# Patient Record
Sex: MALE | Race: WHITE | NOT HISPANIC OR LATINO | URBAN - METROPOLITAN AREA
[De-identification: names, ages, dates, MRNs, and addresses within clinical notes are randomized per-mention and may not be internally consistent; named-entity substitution may affect disease eponyms.]

---

## 2020-09-28 ENCOUNTER — TELEPHONE (OUTPATIENT)
Dept: ORTHOPEDICS | Facility: CLINIC | Age: 65
End: 2020-09-28

## 2020-09-28 ENCOUNTER — OFFICE VISIT (OUTPATIENT)
Dept: URGENT CARE | Facility: CLINIC | Age: 65
End: 2020-09-28
Payer: COMMERCIAL

## 2020-09-28 VITALS
DIASTOLIC BLOOD PRESSURE: 88 MMHG | WEIGHT: 245 LBS | HEART RATE: 77 BPM | TEMPERATURE: 98 F | SYSTOLIC BLOOD PRESSURE: 131 MMHG | OXYGEN SATURATION: 95 % | RESPIRATION RATE: 18 BRPM | BODY MASS INDEX: 36.29 KG/M2 | HEIGHT: 69 IN

## 2020-09-28 DIAGNOSIS — T14.90XA TRAUMA: ICD-10-CM

## 2020-09-28 DIAGNOSIS — M25.522 LEFT ELBOW PAIN: ICD-10-CM

## 2020-09-28 DIAGNOSIS — S52.125A CLOSED NONDISPLACED FRACTURE OF HEAD OF LEFT RADIUS, INITIAL ENCOUNTER: Primary | ICD-10-CM

## 2020-09-28 PROCEDURE — 99203 PR OFFICE/OUTPT VISIT, NEW, LEVL III, 30-44 MIN: ICD-10-PCS | Mod: S$GLB,,, | Performed by: NURSE PRACTITIONER

## 2020-09-28 PROCEDURE — 73080 X-RAY EXAM OF ELBOW: CPT | Mod: 26,FY,LT,S$GLB | Performed by: RADIOLOGY

## 2020-09-28 PROCEDURE — 73080 XR ELBOW COMPLETE 3 VIEW LEFT: ICD-10-PCS | Mod: 26,FY,LT,S$GLB | Performed by: RADIOLOGY

## 2020-09-28 PROCEDURE — 99203 OFFICE O/P NEW LOW 30 MIN: CPT | Mod: S$GLB,,, | Performed by: NURSE PRACTITIONER

## 2020-09-28 RX ORDER — TAMSULOSIN HYDROCHLORIDE 0.4 MG/1
CAPSULE ORAL
COMMUNITY
Start: 2020-09-05

## 2020-09-28 RX ORDER — INSULIN LISPRO 100 [IU]/ML
INJECTION, SOLUTION INTRAVENOUS; SUBCUTANEOUS
COMMUNITY

## 2020-09-28 RX ORDER — CITALOPRAM 20 MG/1
TABLET, FILM COATED ORAL
COMMUNITY
Start: 2020-09-28

## 2020-09-28 RX ORDER — HYDROCHLOROTHIAZIDE 12.5 MG/1
CAPSULE ORAL
COMMUNITY

## 2020-09-28 RX ORDER — CHOLECALCIFEROL (VITAMIN D3) 25 MCG
TABLET ORAL
COMMUNITY

## 2020-09-28 RX ORDER — TESTOSTERONE CYPIONATE 200 MG/ML
200 INJECTION, SOLUTION INTRAMUSCULAR
COMMUNITY

## 2020-09-28 RX ORDER — RAMIPRIL 2.5 MG/1
CAPSULE ORAL
COMMUNITY

## 2020-09-28 RX ORDER — ALLOPURINOL 300 MG/1
TABLET ORAL
COMMUNITY

## 2020-09-28 RX ORDER — PRIMIDONE 50 MG/1
TABLET ORAL
COMMUNITY

## 2020-09-28 RX ORDER — SEMAGLUTIDE 1.34 MG/ML
1 INJECTION, SOLUTION SUBCUTANEOUS
COMMUNITY

## 2020-09-28 RX ORDER — TRAMADOL HYDROCHLORIDE 50 MG/1
50 TABLET ORAL EVERY 6 HOURS PRN
Qty: 20 TABLET | Refills: 0 | Status: SHIPPED | OUTPATIENT
Start: 2020-09-28 | End: 2020-10-03

## 2020-09-28 RX ORDER — ATORVASTATIN CALCIUM 10 MG/1
TABLET, FILM COATED ORAL
COMMUNITY

## 2020-09-28 RX ORDER — ASPIRIN 81 MG/1
TABLET ORAL
COMMUNITY

## 2020-09-28 NOTE — TELEPHONE ENCOUNTER
Spoke with pt.  He is currently in town on vacation.   Pt has an orthopedist back home in [place].    Pt will make an appointment to see his orthopedist upon his return there on 10/5.    Pt advised, per MD, that he will need surgical repair of his left elbow injury.   Pt advised that the longer he waits to have this repaired, the more stiff his elbow will become.  Pt verbalized understanding .   Pt reports he was given a sling and tramadol at .   Pt is requesting an appointment with Orthopedics for a splint or bracing until he returns home to [place].     Pt scheduled to see Ludy Zavala PA-C tomorrow morning at 745 am for evaluation and placement into a posterior long arm splint.    Pt verbalized understanding.

## 2020-09-28 NOTE — PATIENT INSTRUCTIONS
Elbow Fracture    You have a break or fracture of the elbow. That means you have a crack or break in one or more of the bones of the elbow joint. Fractures usually takes 4-12 weeks to heal, depending on the type. Initial treatment is with a splint or cast. Severe fractures may require surgery to put the bone fragments back into place.  The elbow joint is formed by three arm bones:  · Radius. This is the bone on the thumb side of the forearm.  · Ulna. This is the bone on the little-finger side of the forearm. The ulna forms the tip of the elbow.  · Humerus. This is the upper arm bone that connects to the shoulder.  Home care  · Keep your arm elevated to reduce pain and swelling. When sitting or lying down elevate your arm above the level of your heart. You can do this by placing your arm on a pillow that rests on your chest or on a pillow at your side. This is most important during the first 48 hours after injury.  · Apply an ice pack over the injured area for 15 to 20 minutes every 3 to 6 hours. You should do this for the first 24 to 48 hours. You can make an ice pack by filling a plastic bag that seals at the top with ice cubes and then wrapping it with a thin towel. Never put ice or an ice pack directly on your skin. You can place the ice pack inside the sling and directly over the splint. Continue to use ice packs for relief of pain and swelling as needed. As the ice melts, be careful to avoid getting your wrap, splint, or cast wet. After 48 hours, apply heat (warm shower or warm bath) for 15 to 20 minutes several times a day, or alternate ice and heat.  · If you were given a sling and splint, leave it in place for the time advised. Keep the splint completely dry at all times. Bathe with your splint out of the water protected with 2 large plastic bags, one outside of the other, each taped with duct tape at the top end. If a fiberglass splint/cast gets wet, you can dry it with a hair dryer on a cool  setting.  · If you were given a sling only, wear it for the first week. Unless told otherwise, gradually begin range of motion exercises, after the first week, or as advised by your healthcare provider.  · You may use over-the-counter pain medicine to control pain, unless another pain medicine was prescribed. If you have chronic liver or kidney disease or ever had a stomach ulcer or GI bleeding, talk with your healthcare provider using these medicines.  Follow-up care  Follow up with your healthcare provider, or as advised.  An elbow joint will become stiff if immobile for too long. Ask your healthcare provider when to begin range of motion exercises to prevent the elbow from getting stiff. If X-rays were taken, you will be told if there are any new findings that may affect your care.  When to seek medical advice  Call your healthcare provider right away if any of these occur:  · The plaster splint becomes wet or soft  · The cast becomes loose  · The fiberglass splint remains wet for more than 24 hours  · Increased tightness or pain develops in the elbow  · A foul smell comes from the cast  · Fingers become swollen, cold, blue, numb or tingly  Date Last Reviewed: 12/3/2015  © 1959-0040 [employer]. [address] [place] [address], [place] [address]. All rights reserved. This information is not intended as a substitute for professional medical care. Always follow your healthcare professional's instructions.

## 2020-09-28 NOTE — TELEPHONE ENCOUNTER
----- Message from Fouzia Stevens LPN sent at 9/28/2020  1:16 PM CDT -----  Regarding: FW: Orthopedic Referral    ----- Message -----  From: Marilyn Sinha  Sent: 9/28/2020  12:27 PM CDT  To: [facility] Ortho Triage  Subject: Orthopedic Referral                              Good Afternoon,    We received a call from Urgent Care for this patient to be seen a soon as possible. The diagnosis is fracture in elbow. Please let me know if there is anything else you need.    Thank you,  Marilyn

## 2020-09-28 NOTE — PROGRESS NOTES
"Subjective:       Patient ID: Patrice Hodge is a 65 y.o. male.    Vitals:  height is 5' 9" (1.753 m) and weight is 111.1 kg (245 lb). His temperature is 98.4 °F (36.9 °C). His blood pressure is 131/88 and his pulse is 77. His respiration is 18 and oxygen saturation is 95%.     Chief Complaint: Elbow Injury    Patient presents with c.o left elbow pain. Pain secondary to a fall that occurred last night. Patient denies head trauma in the incident. Denies abrasions or cuts.     Provider note begins below:    Mr Hodge fell from standing and hit left elbow last night. Able to flex/extend with limited ROM and quite painful. No numbness or paresthesias. Left radial pulse present and strong. Distal left phalanges cap refill < 3 sec.    No cuts or abrasions. No head trauma or LOC.    Elbow Injury  This is a new problem. The current episode started yesterday. Episode frequency: once. The problem has been gradually worsening. Associated symptoms include joint swelling. Pertinent negatives include no abdominal pain, fatigue or vertigo. Exacerbated by: movement. He has tried NSAIDs for the symptoms. The treatment provided no relief.       Constitution: Negative for fatigue.   HENT: Negative for facial swelling and facial trauma.    Neck: Negative for neck stiffness.   Cardiovascular: Negative for chest trauma.   Eyes: Negative for eye trauma, double vision and blurred vision.   Gastrointestinal: Negative for abdominal trauma, abdominal pain and rectal bleeding.   Genitourinary: Negative for hematuria, genital trauma and pelvic pain.   Musculoskeletal: Positive for pain, trauma and joint swelling. Negative for abnormal ROM of joint and pain with walking.   Skin: Negative for color change, wound, abrasion and laceration.   Neurological: Negative for dizziness, history of vertigo, light-headedness, coordination disturbances, altered mental status and loss of consciousness.   Hematologic/Lymphatic: Negative for history of bleeding " disorder.   Psychiatric/Behavioral: Negative for altered mental status.       Objective:      Physical Exam   Constitutional: He is oriented to person, place, and time. He appears well-developed. He is cooperative.  Non-toxic appearance. He does not appear ill. No distress.   HENT:   Head: Normocephalic and atraumatic.   Ears:   Right Ear: Hearing and external ear normal.   Left Ear: Hearing and external ear normal.   Nose: Nose normal. No mucosal edema, rhinorrhea or nasal deformity. No epistaxis.   Mouth/Throat: Mucous membranes are normal.   Eyes: Conjunctivae and lids are normal. Right eye exhibits no discharge. Left eye exhibits no discharge. No scleral icterus.   Neck: Trachea normal, normal range of motion, full passive range of motion without pain and phonation normal. Neck supple.   Cardiovascular: Normal rate, regular rhythm, normal heart sounds and normal pulses.   Pulmonary/Chest: Effort normal and breath sounds normal. No respiratory distress.   Abdominal: Soft. Normal appearance and bowel sounds are normal. He exhibits no distension, no pulsatile midline mass and no mass. There is no abdominal tenderness.   Musculoskeletal:         General: No deformity.      Left elbow: He exhibits decreased range of motion, swelling and effusion. He exhibits no deformity and no laceration. Tenderness found. Radial head tenderness noted.   Neurological: He is alert and oriented to person, place, and time. He exhibits normal muscle tone. Coordination normal.   Skin: Skin is warm, dry, intact, not diaphoretic and not pale. Psychiatric: His speech is normal and behavior is normal. Judgment and thought content normal.   Nursing note and vitals reviewed.    Xr Elbow Complete 3 View Left    Result Date: 9/28/2020  EXAMINATION: XR ELBOW COMPLETE 3 VIEW LEFT CLINICAL HISTORY: Injury, unspecified, initial encounter TECHNIQUE: AP, lateral, and oblique views of the left elbow were performed. COMPARISON: None FINDINGS: Acute,  traumatic, displaced fracture through the radial head.  Adjacent soft tissue edema.  Small elbow joint effusion.     Acute, traumatic, mildly displaced radial head fracture. Electronically signed by: Aysha Olguin Date:    09/28/2020 Time:    12:13        Assessment:       1. Closed nondisplaced fracture of head of left radius, initial encounter    2. Trauma    3. Left elbow pain        Plan:       Xrays ordered at this visit reviewed.     Closed nondisplaced fracture of head of left radius, initial encounter  -     SLING FOR HOME USE  -     Ambulatory referral/consult to Orthopedics    Trauma  -     XR ELBOW COMPLETE 3 VIEW LEFT; Future; Expected date: 09/28/2020  -     Ambulatory referral/consult to Orthopedics    Left elbow pain  -     traMADoL (ULTRAM) 50 mg tablet; Take 1 tablet (50 mg total) by mouth every 6 (six) hours as needed for Pain.  Dispense: 20 tablet; Refill: 0  -     Ambulatory referral/consult to Orthopedics      Patient Instructions     Elbow Fracture    You have a break or fracture of the elbow. That means you have a crack or break in one or more of the bones of the elbow joint. Fractures usually takes 4-12 weeks to heal, depending on the type. Initial treatment is with a splint or cast. Severe fractures may require surgery to put the bone fragments back into place.  The elbow joint is formed by three arm bones:  · Radius. This is the bone on the thumb side of the forearm.  · Ulna. This is the bone on the little-finger side of the forearm. The ulna forms the tip of the elbow.  · Humerus. This is the upper arm bone that connects to the shoulder.  Home care  · Keep your arm elevated to reduce pain and swelling. When sitting or lying down elevate your arm above the level of your heart. You can do this by placing your arm on a pillow that rests on your chest or on a pillow at your side. This is most important during the first 48 hours after injury.  · Apply an ice pack over the injured area for 15  to 20 minutes every 3 to 6 hours. You should do this for the first 24 to 48 hours. You can make an ice pack by filling a plastic bag that seals at the top with ice cubes and then wrapping it with a thin towel. Never put ice or an ice pack directly on your skin. You can place the ice pack inside the sling and directly over the splint. Continue to use ice packs for relief of pain and swelling as needed. As the ice melts, be careful to avoid getting your wrap, splint, or cast wet. After 48 hours, apply heat (warm shower or warm bath) for 15 to 20 minutes several times a day, or alternate ice and heat.  · If you were given a sling and splint, leave it in place for the time advised. Keep the splint completely dry at all times. Bathe with your splint out of the water protected with 2 large plastic bags, one outside of the other, each taped with duct tape at the top end. If a fiberglass splint/cast gets wet, you can dry it with a hair dryer on a cool setting.  · If you were given a sling only, wear it for the first week. Unless told otherwise, gradually begin range of motion exercises, after the first week, or as advised by your healthcare provider.  · You may use over-the-counter pain medicine to control pain, unless another pain medicine was prescribed. If you have chronic liver or kidney disease or ever had a stomach ulcer or GI bleeding, talk with your healthcare provider using these medicines.  Follow-up care  Follow up with your healthcare provider, or as advised.  An elbow joint will become stiff if immobile for too long. Ask your healthcare provider when to begin range of motion exercises to prevent the elbow from getting stiff. If X-rays were taken, you will be told if there are any new findings that may affect your care.  When to seek medical advice  Call your healthcare provider right away if any of these occur:  · The plaster splint becomes wet or soft  · The cast becomes loose  · The fiberglass splint remains  wet for more than 24 hours  · Increased tightness or pain develops in the elbow  · A foul smell comes from the cast  · Fingers become swollen, cold, blue, numb or tingly  Date Last Reviewed: 12/3/2015  © 4617-3109 [employer]. [address] [place] [id number]. All rights reserved. This information is not intended as a substitute for professional medical care. Always follow your healthcare professional's instructions.

## 2020-09-29 ENCOUNTER — OFFICE VISIT (OUTPATIENT)
Dept: ORTHOPEDICS | Facility: CLINIC | Age: 65
End: 2020-09-29
Payer: COMMERCIAL

## 2020-09-29 VITALS
BODY MASS INDEX: 36.29 KG/M2 | WEIGHT: 245 LBS | SYSTOLIC BLOOD PRESSURE: 144 MMHG | HEIGHT: 69 IN | TEMPERATURE: 97 F | DIASTOLIC BLOOD PRESSURE: 86 MMHG | HEART RATE: 76 BPM | RESPIRATION RATE: 18 BRPM

## 2020-09-29 DIAGNOSIS — S52.122A CLOSED DISPLACED FRACTURE OF HEAD OF LEFT RADIUS, INITIAL ENCOUNTER: Primary | ICD-10-CM

## 2020-09-29 PROCEDURE — 3008F PR BODY MASS INDEX (BMI) DOCUMENTED: ICD-10-PCS | Mod: CPTII,S$GLB,, | Performed by: PHYSICIAN ASSISTANT

## 2020-09-29 PROCEDURE — 3008F BODY MASS INDEX DOCD: CPT | Mod: CPTII,S$GLB,, | Performed by: PHYSICIAN ASSISTANT

## 2020-09-29 PROCEDURE — 29105 APPLICATION LONG ARM SPLINT: CPT | Mod: LT,S$GLB,, | Performed by: PHYSICIAN ASSISTANT

## 2020-09-29 PROCEDURE — 99203 OFFICE O/P NEW LOW 30 MIN: CPT | Mod: 25,S$GLB,, | Performed by: PHYSICIAN ASSISTANT

## 2020-09-29 PROCEDURE — 29105 PR APPLY LONG ARM SPLINT: ICD-10-PCS | Mod: LT,S$GLB,, | Performed by: PHYSICIAN ASSISTANT

## 2020-09-29 PROCEDURE — 99203 PR OFFICE/OUTPT VISIT, NEW, LEVL III, 30-44 MIN: ICD-10-PCS | Mod: 25,S$GLB,, | Performed by: PHYSICIAN ASSISTANT

## 2020-09-29 PROCEDURE — 99999 PR PBB SHADOW E&M-EST. PATIENT-LVL IV: CPT | Mod: PBBFAC,,, | Performed by: PHYSICIAN ASSISTANT

## 2020-09-29 PROCEDURE — 99999 PR PBB SHADOW E&M-EST. PATIENT-LVL IV: ICD-10-PCS | Mod: PBBFAC,,, | Performed by: PHYSICIAN ASSISTANT

## 2020-09-29 RX ORDER — HYDROCODONE BITARTRATE AND ACETAMINOPHEN 5; 325 MG/1; MG/1
1 TABLET ORAL
Qty: 42 TABLET | Refills: 0 | Status: SHIPPED | OUTPATIENT
Start: 2020-09-29

## 2020-09-29 NOTE — LETTER
September 29, 2020      Josef Wilson, CIERRA  [address]  [place] [address]  [facility] [address]           [facility] 5th [address]  [address]  [facility] [address]  Phone: [phone]          Patient: Patrice Hodge   MR Number: [id number]   YOB: 1955   Date of Visit: 9/29/2020       Dear Josef Wilson:    Thank you for referring Patrice Hodge to me for evaluation. Attached you will find relevant portions of my assessment and plan of care.    If you have questions, please do not hesitate to call me. I look forward to following Patrice Hodge along with you.    Sincerely,    Ludy Zavala PA-C    Enclosure  CC:  No Recipients    If you would like to receive this communication electronically, please contact [email or web address] or [phone] to request more information on [facility] Link access.    For providers and/or their staff who would like to refer a patient to [facility], please contact us through our one-stop-shop provider referral line, [facility] , at [phone].    If you feel you have received this communication in error or would no longer like to receive these types of communications, please e-mail [email or web address]

## 2020-09-29 NOTE — PROGRESS NOTES
Subjective:      Patient ID: Patrice Hodge is a 65 y.o. male.    Chief Complaint: No chief complaint on file.    HPI    Patient is a 65 year RHD old malewho presented to urgent care for left elbow pain after a fall from standing height on 09/27/2020. RADS: A displaced fracture through the radial head. Patient was treated with a sling. Pain not is controlled. He is taking tramadol for pain. Denied any numbness or tingling.     Patient lived in [place]. He is is visiting [place] on vacation.    Review of Systems   Constitution: Negative for chills and fever.   Cardiovascular: Negative for chest pain.   Respiratory: Negative for cough and shortness of breath.    Skin: Negative for color change, dry skin, itching, nail changes, poor wound healing and rash.   Musculoskeletal:        Left elbow fracture   Neurological: Negative for dizziness.   Psychiatric/Behavioral: Negative for altered mental status. The patient is not nervous/anxious.    All other systems reviewed and are negative.        Objective:      General    Constitutional: He is oriented to person, place, and time. He appears well-developed and well-nourished. No distress.   HENT:   Head: Atraumatic.   Eyes: Conjunctivae are normal.   Cardiovascular: Normal rate.    Pulmonary/Chest: Effort normal.   Neurological: He is alert and oriented to person, place, and time.   Psychiatric: He has a normal mood and affect. His behavior is normal.         Left Elbow Exam     Comments:  Moderate swelling  No abrasions or skin breakdown  range of motion not tested due to fracture.   NVI           RADS: reviewed by myself and :   Acute, traumatic, displaced fracture through the radial head.  Adjacent soft tissue edema.  Small elbow joint effusion  Assessment:       Encounter Diagnosis   Name Primary?    Closed displaced fracture of head of left radius, initial encounter Yes          Plan:     Discussed plan with patient.   At this time he was placed into a long  arm splint. He is NWB. Has sling for comfort, will work on range of motion of fingers.    His pain medication was increase to [place] 5.   He reports that he has contacted his orthopedist in [place]. He is changing his flight and will return home early from his vacation to see his orthopedist to discuss further treatment. He has a disc with the images and the image report.  He understands the this is time sensitive and will see medical attention as soon as possible to help prevent stiffness and further debility.